# Patient Record
Sex: MALE | Race: WHITE | NOT HISPANIC OR LATINO | Employment: OTHER | URBAN - METROPOLITAN AREA
[De-identification: names, ages, dates, MRNs, and addresses within clinical notes are randomized per-mention and may not be internally consistent; named-entity substitution may affect disease eponyms.]

---

## 2023-08-20 ENCOUNTER — APPOINTMENT (EMERGENCY)
Dept: RADIOLOGY | Facility: HOSPITAL | Age: 68
End: 2023-08-20
Payer: MEDICARE

## 2023-08-20 ENCOUNTER — HOSPITAL ENCOUNTER (EMERGENCY)
Facility: HOSPITAL | Age: 68
Discharge: HOME | End: 2023-08-20
Attending: EMERGENCY MEDICINE | Admitting: EMERGENCY MEDICINE
Payer: MEDICARE

## 2023-08-20 VITALS
DIASTOLIC BLOOD PRESSURE: 78 MMHG | WEIGHT: 145 LBS | HEIGHT: 70 IN | HEART RATE: 78 BPM | SYSTOLIC BLOOD PRESSURE: 150 MMHG | BODY MASS INDEX: 20.76 KG/M2 | OXYGEN SATURATION: 98 % | TEMPERATURE: 96.7 F | RESPIRATION RATE: 18 BRPM

## 2023-08-20 DIAGNOSIS — R55 SYNCOPE AND COLLAPSE: Primary | ICD-10-CM

## 2023-08-20 LAB
ANION GAP SERPL CALC-SCNC: 3 MEQ/L (ref 3–15)
BASOPHILS # BLD: 0.03 K/UL (ref 0.01–0.1)
BASOPHILS NFR BLD: 0.4 %
BUN SERPL-MCNC: 19 MG/DL (ref 7–25)
CALCIUM SERPL-MCNC: 8.4 MG/DL (ref 8.6–10.3)
CHLORIDE SERPL-SCNC: 108 MEQ/L (ref 98–107)
CO2 SERPL-SCNC: 29 MEQ/L (ref 21–31)
CREAT SERPL-MCNC: 1 MG/DL (ref 0.7–1.3)
D DIMER PPP IA.FEU-MCNC: 0.4 UG/ML FEU (ref 0–0.5)
DIFFERENTIAL METHOD BLD: ABNORMAL
EOSINOPHIL # BLD: 0.01 K/UL (ref 0.04–0.54)
EOSINOPHIL NFR BLD: 0.1 %
ERYTHROCYTE [DISTWIDTH] IN BLOOD BY AUTOMATED COUNT: 12.6 % (ref 11.6–14.4)
GFR SERPL CREATININE-BSD FRML MDRD: >60 ML/MIN/1.73M*2
GLUCOSE BLD-MCNC: 142 MG/DL (ref 70–99)
GLUCOSE BLOOD, POC: 142
GLUCOSE SERPL-MCNC: 148 MG/DL (ref 70–99)
HCT VFR BLDCO AUTO: 40.8 % (ref 40.1–51)
HGB BLD-MCNC: 13.3 G/DL (ref 13.7–17.5)
IMM GRANULOCYTES # BLD AUTO: 0.04 K/UL (ref 0–0.08)
IMM GRANULOCYTES NFR BLD AUTO: 0.5 %
LYMPHOCYTES # BLD: 1.18 K/UL (ref 1.2–3.5)
LYMPHOCYTES NFR BLD: 14.8 %
MCH RBC QN AUTO: 30.6 PG (ref 28–33.2)
MCHC RBC AUTO-ENTMCNC: 32.6 G/DL (ref 32.2–36.5)
MCV RBC AUTO: 93.8 FL (ref 83–98)
MONOCYTES # BLD: 0.61 K/UL (ref 0.3–1)
MONOCYTES NFR BLD: 7.7 %
NEUTROPHILS # BLD: 6.09 K/UL (ref 1.7–7)
NEUTS SEG NFR BLD: 76.5 %
NRBC BLD-RTO: 0 %
PDW BLD AUTO: 10.4 FL (ref 9.4–12.4)
PLATELET # BLD AUTO: 137 K/UL (ref 150–350)
POCT TEST: ABNORMAL
POTASSIUM SERPL-SCNC: 4.1 MEQ/L (ref 3.5–5.1)
RBC # BLD AUTO: 4.35 M/UL (ref 4.5–5.8)
SODIUM SERPL-SCNC: 140 MEQ/L (ref 136–145)
TROPONIN I SERPL HS-MCNC: 5.7 PG/ML
TROPONIN I SERPL HS-MCNC: 6 PG/ML
WBC # BLD AUTO: 7.96 K/UL (ref 3.8–10.5)

## 2023-08-20 PROCEDURE — 3E0337Z INTRODUCTION OF ELECTROLYTIC AND WATER BALANCE SUBSTANCE INTO PERIPHERAL VEIN, PERCUTANEOUS APPROACH: ICD-10-PCS | Performed by: EMERGENCY MEDICINE

## 2023-08-20 PROCEDURE — 80048 BASIC METABOLIC PNL TOTAL CA: CPT | Performed by: PHYSICIAN ASSISTANT

## 2023-08-20 PROCEDURE — 96360 HYDRATION IV INFUSION INIT: CPT

## 2023-08-20 PROCEDURE — 99284 EMERGENCY DEPT VISIT MOD MDM: CPT | Mod: 25

## 2023-08-20 PROCEDURE — 84484 ASSAY OF TROPONIN QUANT: CPT | Performed by: PHYSICIAN ASSISTANT

## 2023-08-20 PROCEDURE — 25800000 HC PHARMACY IV SOLUTIONS: Performed by: PHYSICIAN ASSISTANT

## 2023-08-20 PROCEDURE — 84484 ASSAY OF TROPONIN QUANT: CPT | Mod: 91 | Performed by: PHYSICIAN ASSISTANT

## 2023-08-20 PROCEDURE — 93005 ELECTROCARDIOGRAM TRACING: CPT | Performed by: PHYSICIAN ASSISTANT

## 2023-08-20 PROCEDURE — 96361 HYDRATE IV INFUSION ADD-ON: CPT

## 2023-08-20 PROCEDURE — 71045 X-RAY EXAM CHEST 1 VIEW: CPT

## 2023-08-20 PROCEDURE — 70450 CT HEAD/BRAIN W/O DYE: CPT | Mod: MG

## 2023-08-20 PROCEDURE — 85379 FIBRIN DEGRADATION QUANT: CPT | Performed by: PHYSICIAN ASSISTANT

## 2023-08-20 PROCEDURE — 36415 COLL VENOUS BLD VENIPUNCTURE: CPT | Performed by: PHYSICIAN ASSISTANT

## 2023-08-20 PROCEDURE — 85025 COMPLETE CBC W/AUTO DIFF WBC: CPT | Performed by: PHYSICIAN ASSISTANT

## 2023-08-20 RX ORDER — LISINOPRIL 2.5 MG/1
2.5 TABLET ORAL DAILY
COMMUNITY

## 2023-08-20 RX ORDER — ROSUVASTATIN CALCIUM 5 MG/1
5 TABLET, COATED ORAL DAILY
COMMUNITY

## 2023-08-20 RX ADMIN — SODIUM CHLORIDE 1000 ML: 9 INJECTION, SOLUTION INTRAVENOUS at 10:08

## 2023-08-20 ASSESSMENT — ENCOUNTER SYMPTOMS
PALPITATIONS: 0
VOMITING: 0
FEVER: 0
SHORTNESS OF BREATH: 0
ABDOMINAL PAIN: 0
LIGHT-HEADEDNESS: 1
NAUSEA: 0
CHEST TIGHTNESS: 0

## 2023-08-20 NOTE — ED PROVIDER NOTES
Emergency Medicine Note  HPI   HISTORY OF PRESENT ILLNESS     Patient reports he was seated eating on a croissant started to feel unwell sweaty feel like he needed to have a bowel movement diarrhea.  Sat down feeling lightheaded with spots in both visual fields feeling faint..  He states he had stood up feeling worse.  Sat back down with spots in his vision.  Denies any headache.  Denies any extremity numbness or tingling.  Denies any difficulty with speech.  Does not feel he lost consciousness.  Currently without complaints.    Spouse states they are watching children for pickleball match.  Patient gotten up and walked away sat down's concern he was feeling unwell.  She had gone over to patient was sweating seated.  Stated need to go to the bathroom her future son-in-law helped patient to stand and then patient collapsed eyes open not responding for a brief period of time had a brief episode of slurred speech when he started to respond.  Lasting less than a minute.  EMS was called patient brought to the ER for evaluation.    EMS report patient with slurred speech Accu-Chek unremarkable brought to the ER for evaluation of possible stroke.            Patient History   PAST HISTORY     Reviewed from Nursing Triage:       Past Medical History:   Diagnosis Date   • Hypertension        Past Surgical History:   Procedure Laterality Date   • CHOLECYSTECTOMY     • PROSTATECTOMY  07/29/2015       No family history on file.    Social History     Tobacco Use   • Smoking status: Never   • Smokeless tobacco: Never   Substance Use Topics   • Alcohol use: Yes     Comment: socially   • Drug use: Never         Review of Systems   REVIEW OF SYSTEMS     Review of Systems   Constitutional: Negative for fever.   Eyes: Positive for visual disturbance.   Respiratory: Negative for chest tightness and shortness of breath.    Cardiovascular: Negative for palpitations.   Gastrointestinal: Negative for abdominal pain, nausea and vomiting.    Neurological: Positive for light-headedness.         VITALS     ED Vitals    Date/Time Temp Pulse Resp BP SpO2 [facility]   08/20/23 1331 -- 78 18 150/78 98 % ARC   08/20/23 1157 -- 76 16 144/89 99 % ARC   08/20/23 1142 -- 72 14 144/84 98 % ARC   08/20/23 1029 -- 74 14 140/78 97 % ARC   08/20/23 0959 35.9 °C (96.7 °F) 74 18 135/85 100 % ARC        Pulse Ox %: 100 % (08/20/23 1023)  Pulse Ox Interpretation: Normal (08/20/23 1023)  Heart Rate: 71 (08/20/23 1023)  Rhythm Strip Interpretation: Normal Sinus Rhythm (08/20/23 1023)     Physical Exam   PHYSICAL EXAM     Physical Exam  Vitals and nursing note reviewed.   Cardiovascular:      Rate and Rhythm: Normal rate and regular rhythm.   Pulmonary:      Effort: Pulmonary effort is normal.      Breath sounds: Normal breath sounds. No wheezing or rales.   Abdominal:      General: Bowel sounds are normal.      Palpations: Abdomen is soft.      Tenderness: There is no abdominal tenderness. There is no guarding.   Musculoskeletal:      Right lower leg: No edema.      Left lower leg: No edema.   Neurological:      General: No focal deficit present.      Mental Status: He is alert and oriented to person, place, and time.      Cranial Nerves: No cranial nerve deficit, dysarthria or facial asymmetry.      Sensory: No sensory deficit.      Motor: No weakness.      Coordination: Romberg sign negative. Coordination normal. Finger-Nose-Finger Test normal.   Psychiatric:         Mood and Affect: Mood normal.           PROCEDURES     Procedures     DATA     Results     Procedure Component Value Units Date/Time    D-dimer, quantitative [[id number]]  (Normal) Collected: 08/20/23 1005    Specimen: Blood, Venous Updated: 08/20/23 1053     D-Dimer, Quant 0.40 ug/mL FEU      Comment: Suggested Age Related Reference Range: 0.00 - 0.68  The D-Dimer assay can be used as an aid in the diagnosis of DVT or PE. The test can not be used by itself to exclude DVT or PE. When used as a diagnostic aid, the  cutoff value is the same as the reference range: <0.5 ug/ml FEU.       HS Troponin (with 2 hour reflex) [[id number]]  (Normal) Collected: 08/20/23 0958    Specimen: Blood, Venous Updated: 08/20/23 1040     High Sens Troponin I 6.0 pg/mL     Basic metabolic panel [[id number]]  (Abnormal) Collected: 08/20/23 0958    Specimen: Blood, Venous Updated: 08/20/23 1033     Sodium 140 mEQ/L      Potassium 4.1 mEQ/L      Comment: Results obtained on plasma. Plasma Potassium values may be up to 0.4 mEQ/L less than serum values. The differences may be greater for patients with high platelet or white cell counts.        Chloride 108 mEQ/L      CO2 29 mEQ/L      BUN 19 mg/dL      Creatinine 1.0 mg/dL      Glucose 148 mg/dL      Calcium 8.4 mg/dL      eGFR >60.0 mL/min/1.73m*2      Anion Gap 3 mEQ/L     CBC and differential [[id number]]  (Abnormal) Collected: 08/20/23 0958    Specimen: Blood, Venous Updated: 08/20/23 1022     WBC 7.96 K/uL      RBC 4.35 M/uL      Hemoglobin 13.3 g/dL      Hematocrit 40.8 %      MCV 93.8 fL      MCH 30.6 pg      MCHC 32.6 g/dL      RDW 12.6 %      Platelets 137 K/uL      Comment: SPECIMEN QUALITY CHECKEDRESULTS OBTAINED AFTER VORTEXING TO ELIMINATE PLT CLUMPSALL RESULTS HAVE BEEN RECHECKED        MPV 10.4 fL      Differential Type Auto     nRBC 0.0 %      Immature Granulocytes 0.5 %      Neutrophils 76.5 %      Lymphocytes 14.8 %      Monocytes 7.7 %      Eosinophils 0.1 %      Basophils 0.4 %      Immature Granulocytes, Absolute 0.04 K/uL      Neutrophils, Absolute 6.09 K/uL      Lymphocytes, Absolute 1.18 K/uL      Monocytes, Absolute 0.61 K/uL      Eosinophils, Absolute 0.01 K/uL      Basophils, Absolute 0.03 K/uL     POCT glucose [[id number]]  (Normal) Resulted: 08/20/23 1008     Updated: 08/20/23 1008     Glucose Blood,           Imaging Results          CT HEAD WITHOUT IV CONTRAST (Final result)  Result time 08/20/23 11:53:41    Final result                 Impression:    IMPRESSION:    No  acute intracranial abnormality.               Narrative:    CLINICAL HISTORY: Unresponsive episode.    CT of the brain was performed without intravenous contrast.  Sagittal and  coronal reformatted images were obtained.  Automated exposure control was used.    COMPARISON: None available.    COMMENT:    The ventricles and sulci are age-appropriate. The gray-white differentiation is  preserved. There is an old lacunar infarct in the right caudate head.    There are no abnormal extra-axial fluid collections. There is no acute  intracranial hemorrhage. There is no mass effect or midline shift.    The orbits are grossly unremarkable. The paranasal sinuses are clear. The  mastoid air cells are clear. The calvarium is intact.                               X-RAY CHEST 1 VIEW (Final result)  Result time 08/20/23 10:25:15    Final result                 Impression:    IMPRESSION: No acute cardiopulmonary process.             Narrative:    Chest x-ray    CLINICAL HISTORY: Unresponsive.    COMPARISON: None.    COMMENT: Portable AP upright image of the chest obtained.  Cardiomediastinal  silhouette and pulmonary vascularity within normal limits.  Lungs are clear.  Trachea is midline.  No pleural fluid or air.  Osseous structures are intact.                                ECG 12 lead   Independent Interpretation by ED Provider   Rate: 71, Rhythm: sinus with 1st degree AV block, Axis: normal, MI:298 QRS:92 QTc:425, No prior EKGs, no ST/T wave changes            Scoring tools                                  ED Course & MDM   MDM / ED COURSE / CLINICAL IMPRESSION / DISPO     Medical Decision Making  Syncope and collapse: undiagnosed new problem with uncertain prognosis  Amount and/or Complexity of Data Reviewed  Labs: ordered. Decision-making details documented in ED Course.  Radiology: ordered.  ECG/medicine tests: ordered and independent interpretation performed.          ED Course as of 08/20/23 2117   [place] Aug 20, 2023   1000  Patient awake alert nonfocal.  States he did pass out a few years ago in [facility].  States he had another episode where he passed out.  His primary doctor associates with heat.  Currently lives in [place].  Recent flight from [place] without pleuritic chest pain shortness of breath or exertional shortness of breath. [JR]   1119 D-Dimer, Quant: 0.40 [JR]   1152 Update pt on work up [JR]   1304 Discussed the patient at length plan outpatient follow-up with his cardiologist.  Through his primary doctor patient states he did drink 4 beers last night.    Strict return instructions given patient verbalized understanding [JR]      ED Course User Index  [JR] Jeremiah Benson PA C     Clinical Impression      Syncope and collapse     _________________     ED Disposition   Discharge                   Jeremiah Benson PA C  08/20/23 1859

## 2023-08-20 NOTE — ED ATTESTATION NOTE
I have personally seen and examined the patient. I personally performed the key components of the encounter and provided a substantive portion of the care and medical decision making. I reviewed and agree with the physician assistant’s assessment and plan of care, except as where stated below.    My examination, assessment, and plan of care of Wolfgang Kruger is as follows:     Patient is a 68-year-old male who presents the emergency department for evaluation after he had what sound like a syncopal episode.  Patient states that he was going to watch his children play pickle ball.  He had gotten something to eat when he began to feel nauseous, sweaty, as if he was going to pass out.  He saw spots in his vision.  Patient does not recall losing consciousness but per family there, he did pass out.  It sound like family tried to stand him up afterwards and he was not able to do so.  There was a brief episode of slurred speech as he regained consciousness.  EMS brought the patient to the emergency department as a possible stroke alert.  Of note, the triage note is incorrect.  This is what EMS reported but the history from the patient and his wife does not describe any focal weakness.    On exam, patient is awake, alert, overall well-appearing.  Heart rate is normal.  Respirations are nonlabored, lungs are clear.  Abdomen is soft, nontender.  He has no drift in any of his extremities.    At the time of arrival to the emergency department, patient is back to baseline.  He is brought to emergency department as a stroke alert but history sounds more consistent with a syncopal episode rather than CVA/TIA.  As the patient is back to baseline, would not be a candidate for tPA nor for neuro intervention.  EKG on arrival does not have signs of acute ischemia.  Lab work, imaging in process     Becca Perez MD  08/20/23 5518

## 2023-08-20 NOTE — DISCHARGE INSTRUCTIONS
You will need further evaluation in the event today.  We suspect you passed out.  Please call your family doctor tomorrow to range outpatient follow-up.  Please follow-up with cardiologist.    If you have any return of symptoms or return to the ER for reevaluation.

## 2023-08-21 LAB
ATRIAL RATE: 71
P AXIS: 66
PR INTERVAL: 298
QRS DURATION: 92
QT INTERVAL: 392
QTC CALCULATION(BAZETT): 425
R AXIS: 3
T WAVE AXIS: 35
VENTRICULAR RATE: 71